# Patient Record
Sex: FEMALE | Race: WHITE
[De-identification: names, ages, dates, MRNs, and addresses within clinical notes are randomized per-mention and may not be internally consistent; named-entity substitution may affect disease eponyms.]

---

## 2018-09-26 ENCOUNTER — HOSPITAL ENCOUNTER (EMERGENCY)
Dept: HOSPITAL 93 - ER | Age: 78
Discharge: HOME | End: 2018-09-26
Payer: COMMERCIAL

## 2018-09-26 VITALS — WEIGHT: 130 LBS | HEIGHT: 59 IN | BODY MASS INDEX: 26.21 KG/M2

## 2018-09-26 DIAGNOSIS — I25.10: Primary | ICD-10-CM

## 2019-04-06 ENCOUNTER — HOSPITAL ENCOUNTER (INPATIENT)
Dept: HOSPITAL 93 - ER | Age: 79
LOS: 12 days | Discharge: HOME | DRG: 280 | End: 2019-04-18
Attending: INTERNAL MEDICINE | Admitting: INTERNAL MEDICINE
Payer: COMMERCIAL

## 2019-04-06 VITALS — HEIGHT: 63 IN | BODY MASS INDEX: 30.12 KG/M2 | WEIGHT: 170 LBS

## 2019-04-06 DIAGNOSIS — F43.22: ICD-10-CM

## 2019-04-06 DIAGNOSIS — I11.0: ICD-10-CM

## 2019-04-06 DIAGNOSIS — B95.2: ICD-10-CM

## 2019-04-06 DIAGNOSIS — C91.10: ICD-10-CM

## 2019-04-06 DIAGNOSIS — B96.29: ICD-10-CM

## 2019-04-06 DIAGNOSIS — I63.89: ICD-10-CM

## 2019-04-06 DIAGNOSIS — C94.80: ICD-10-CM

## 2019-04-06 DIAGNOSIS — N39.0: ICD-10-CM

## 2019-04-06 DIAGNOSIS — Z16.12: ICD-10-CM

## 2019-04-06 DIAGNOSIS — I21.09: Primary | ICD-10-CM

## 2019-04-06 DIAGNOSIS — I26.99: ICD-10-CM

## 2019-04-06 DIAGNOSIS — H26.8: ICD-10-CM

## 2019-04-06 DIAGNOSIS — I25.10: ICD-10-CM

## 2019-04-06 DIAGNOSIS — M81.0: ICD-10-CM

## 2019-04-06 DIAGNOSIS — J96.02: ICD-10-CM

## 2019-04-06 DIAGNOSIS — I48.0: ICD-10-CM

## 2019-04-06 DIAGNOSIS — I50.20: ICD-10-CM

## 2019-04-06 PROCEDURE — 4A033R1 MEASUREMENT OF ARTERIAL SATURATION, PERIPHERAL, PERCUTANEOUS APPROACH: ICD-10-PCS | Performed by: INTERNAL MEDICINE

## 2019-04-06 PROCEDURE — B226Y0Z COMPUTERIZED TOMOGRAPHY (CT SCAN) OF RIGHT AND LEFT HEART USING OTHER CONTRAST, UNENHANCED AND ENHANCED: ICD-10-PCS | Performed by: INTERNAL MEDICINE

## 2019-04-06 PROCEDURE — BB24ZZZ COMPUTERIZED TOMOGRAPHY (CT SCAN) OF BILATERAL LUNGS: ICD-10-PCS | Performed by: INTERNAL MEDICINE

## 2019-04-06 PROCEDURE — BW28ZZZ COMPUTERIZED TOMOGRAPHY (CT SCAN) OF HEAD: ICD-10-PCS | Performed by: INTERNAL MEDICINE

## 2019-04-06 PROCEDURE — B246ZZZ ULTRASONOGRAPHY OF RIGHT AND LEFT HEART: ICD-10-PCS | Performed by: INTERNAL MEDICINE

## 2019-04-06 PROCEDURE — 3E0F7GC INTRODUCTION OF OTHER THERAPEUTIC SUBSTANCE INTO RESPIRATORY TRACT, VIA NATURAL OR ARTIFICIAL OPENING: ICD-10-PCS | Performed by: INTERNAL MEDICINE

## 2019-04-06 NOTE — NUR
SE RECIBE PTE. FREMENINA ALERTA CONCIENTE Y ORIENTADA EN AMBULANCIA PTE.
REFIERE HOY AL MEDIO RADHA LE COMENZO DIFICULTAD RESPIRATORIA Y EN EL HOGAR
NUESTRA SENORA DE LA PROVIDENCIA PTE. ESTUVO CON PRESIONES ARTERIALES ELEVADAS
AL LLEGAR A AQUI PRESENTO PRESIONES /56. SUBICA EN CHEST PAIN UNIT SE
CONECTA A MONITOR CARDIACO Y SE LE REALIZA EKG. PTE. LLEGA CANALIZADA EN BRAZO
RT. CON ANGIO #22 DE AMBULANCIA. BAJANDOLE .9NS DE 500ML. PTE. CON VARIAS
FRACTURAS DEBIDO A CAIDA.

## 2019-04-06 NOTE — NUR
LUEGO DE FLUID CHALLENGE DE 500ML PTE CON PRESION DE 62/47(52) EN MONITOR Y NO
AUDIBLE MANUAL,SE OBSERVA PTE SUDOROSA Y SE NOTIFICA A DR SIMENTAL QUIEN INDICA
ADMINISTRAR FLUID CHALLENGE DE 500ML Y REALIZAR CT DE PECHO.PTE SIN EGRESO AL
MOMENTO.SE CECILIA BAJO OBSERVACION POR CAMBIOS.

## 2019-04-07 PROCEDURE — 0BH17EZ INSERTION OF ENDOTRACHEAL AIRWAY INTO TRACHEA, VIA NATURAL OR ARTIFICIAL OPENING: ICD-10-PCS | Performed by: INTERNAL MEDICINE

## 2019-04-07 PROCEDURE — 4A12X4Z MONITORING OF CARDIAC ELECTRICAL ACTIVITY, EXTERNAL APPROACH: ICD-10-PCS | Performed by: INTERNAL MEDICINE

## 2019-04-07 PROCEDURE — 5A1945Z RESPIRATORY VENTILATION, 24-96 CONSECUTIVE HOURS: ICD-10-PCS | Performed by: INTERNAL MEDICINE

## 2019-04-10 PROCEDURE — 8E0ZXY6 ISOLATION: ICD-10-PCS | Performed by: INTERNAL MEDICINE

## 2020-09-25 ENCOUNTER — HOSPITAL ENCOUNTER (INPATIENT)
Dept: HOSPITAL 93 - ER | Age: 80
LOS: 33 days | Discharge: HOME | DRG: 981 | End: 2020-10-28
Attending: INTERNAL MEDICINE | Admitting: INTERNAL MEDICINE
Payer: COMMERCIAL

## 2020-09-25 VITALS — BODY MASS INDEX: 30.12 KG/M2 | HEIGHT: 63 IN | WEIGHT: 170 LBS

## 2020-09-25 DIAGNOSIS — K57.30: ICD-10-CM

## 2020-09-25 DIAGNOSIS — L89.312: ICD-10-CM

## 2020-09-25 DIAGNOSIS — D64.9: ICD-10-CM

## 2020-09-25 DIAGNOSIS — Z91.89: ICD-10-CM

## 2020-09-25 DIAGNOSIS — N39.0: ICD-10-CM

## 2020-09-25 DIAGNOSIS — I50.40: ICD-10-CM

## 2020-09-25 DIAGNOSIS — N32.1: Primary | ICD-10-CM

## 2020-09-25 DIAGNOSIS — I25.10: ICD-10-CM

## 2020-09-25 DIAGNOSIS — F43.22: ICD-10-CM

## 2020-09-25 DIAGNOSIS — M81.0: ICD-10-CM

## 2020-09-25 DIAGNOSIS — I48.0: ICD-10-CM

## 2020-09-25 DIAGNOSIS — Z93.3: ICD-10-CM

## 2020-09-25 DIAGNOSIS — Z74.1: ICD-10-CM

## 2020-09-25 DIAGNOSIS — Z99.89: ICD-10-CM

## 2020-09-25 DIAGNOSIS — L89.152: ICD-10-CM

## 2020-09-25 DIAGNOSIS — B96.89: ICD-10-CM

## 2020-09-25 DIAGNOSIS — J96.90: ICD-10-CM

## 2020-09-25 DIAGNOSIS — Z20.828: ICD-10-CM

## 2020-09-25 DIAGNOSIS — C91.10: ICD-10-CM

## 2020-09-25 DIAGNOSIS — J81.1: ICD-10-CM

## 2020-09-25 DIAGNOSIS — E11.8: ICD-10-CM

## 2020-09-25 DIAGNOSIS — E11.65: ICD-10-CM

## 2020-09-25 DIAGNOSIS — R47.9: ICD-10-CM

## 2020-09-25 PROCEDURE — BW21ZZZ COMPUTERIZED TOMOGRAPHY (CT SCAN) OF ABDOMEN AND PELVIS: ICD-10-PCS | Performed by: INTERNAL MEDICINE

## 2020-09-25 PROCEDURE — 4A033R1 MEASUREMENT OF ARTERIAL SATURATION, PERIPHERAL, PERCUTANEOUS APPROACH: ICD-10-PCS | Performed by: INTERNAL MEDICINE

## 2020-09-26 PROCEDURE — 4A12X4Z MONITORING OF CARDIAC ELECTRICAL ACTIVITY, EXTERNAL APPROACH: ICD-10-PCS | Performed by: INTERNAL MEDICINE

## 2020-10-05 PROCEDURE — B246ZZZ ULTRASONOGRAPHY OF RIGHT AND LEFT HEART: ICD-10-PCS | Performed by: INTERNAL MEDICINE

## 2020-10-06 PROCEDURE — 30233P1 TRANSFUSION OF NONAUTOLOGOUS FROZEN RED CELLS INTO PERIPHERAL VEIN, PERCUTANEOUS APPROACH: ICD-10-PCS | Performed by: INTERNAL MEDICINE

## 2020-10-08 PROCEDURE — 0DBN8ZX EXCISION OF SIGMOID COLON, VIA NATURAL OR ARTIFICIAL OPENING ENDOSCOPIC, DIAGNOSTIC: ICD-10-PCS | Performed by: COLON & RECTAL SURGERY

## 2020-10-09 PROCEDURE — 02HV33Z INSERTION OF INFUSION DEVICE INTO SUPERIOR VENA CAVA, PERCUTANEOUS APPROACH: ICD-10-PCS | Performed by: INTERNAL MEDICINE

## 2020-10-14 PROCEDURE — 0D1M4Z4 BYPASS DESCENDING COLON TO CUTANEOUS, PERCUTANEOUS ENDOSCOPIC APPROACH: ICD-10-PCS | Performed by: COLON & RECTAL SURGERY

## 2020-10-14 PROCEDURE — 8E0ZXY6 ISOLATION: ICD-10-PCS | Performed by: INTERNAL MEDICINE

## 2020-10-16 PROCEDURE — 3E0F7GC INTRODUCTION OF OTHER THERAPEUTIC SUBSTANCE INTO RESPIRATORY TRACT, VIA NATURAL OR ARTIFICIAL OPENING: ICD-10-PCS | Performed by: INTERNAL MEDICINE

## 2020-10-19 PROCEDURE — 5A09457 ASSISTANCE WITH RESPIRATORY VENTILATION, 24-96 CONSECUTIVE HOURS, CONTINUOUS POSITIVE AIRWAY PRESSURE: ICD-10-PCS | Performed by: INTERNAL MEDICINE

## 2021-01-29 ENCOUNTER — HOSPITAL ENCOUNTER (INPATIENT)
Dept: HOSPITAL 93 - ER | Age: 81
LOS: 5 days | Discharge: HOME | DRG: 690 | End: 2021-02-03
Attending: INTERNAL MEDICINE | Admitting: INTERNAL MEDICINE
Payer: COMMERCIAL

## 2021-01-29 VITALS — BODY MASS INDEX: 27.42 KG/M2 | WEIGHT: 149 LBS | HEIGHT: 62 IN

## 2021-01-29 DIAGNOSIS — I25.10: ICD-10-CM

## 2021-01-29 DIAGNOSIS — I11.0: ICD-10-CM

## 2021-01-29 DIAGNOSIS — F02.80: ICD-10-CM

## 2021-01-29 DIAGNOSIS — Z20.822: ICD-10-CM

## 2021-01-29 DIAGNOSIS — G30.9: ICD-10-CM

## 2021-01-29 DIAGNOSIS — I50.22: ICD-10-CM

## 2021-01-29 DIAGNOSIS — B96.89: ICD-10-CM

## 2021-01-29 DIAGNOSIS — Z93.3: ICD-10-CM

## 2021-01-29 DIAGNOSIS — C91.10: ICD-10-CM

## 2021-01-29 DIAGNOSIS — Z79.84: ICD-10-CM

## 2021-01-29 DIAGNOSIS — E11.65: ICD-10-CM

## 2021-01-29 DIAGNOSIS — M81.0: ICD-10-CM

## 2021-01-29 DIAGNOSIS — I48.0: ICD-10-CM

## 2021-01-29 DIAGNOSIS — F43.22: ICD-10-CM

## 2021-01-29 DIAGNOSIS — Z74.01: ICD-10-CM

## 2021-01-29 DIAGNOSIS — L89.152: ICD-10-CM

## 2021-01-29 DIAGNOSIS — B95.2: ICD-10-CM

## 2021-01-29 DIAGNOSIS — N39.0: Primary | ICD-10-CM

## 2021-01-29 DIAGNOSIS — B96.5: ICD-10-CM

## 2021-01-29 PROCEDURE — CB2YYZZ TOMOGRAPHIC (TOMO) NUCLEAR MEDICINE IMAGING OF RESPIRATORY SYSTEM USING OTHER RADIONUCLIDE: ICD-10-PCS | Performed by: RADIOLOGY

## 2021-01-29 NOTE — NUR
PACIENTE QUE LLEGA EN COMPANIA DE PERSONAL DE EMERGENCIAS MEDICAS Y FAMILIAR
DESDE UN HOGAR DE ENVEJECIENTES. REFIEREN TRAER LA PACIENTE POR INFECCION DE
ORINA, BLANCOS ELEVADOS ULCERA SACRAL Y DOLOR EN PIERNA IZQUIERDA

## 2021-01-30 PROCEDURE — 4A12X4Z MONITORING OF CARDIAC ELECTRICAL ACTIVITY, EXTERNAL APPROACH: ICD-10-PCS | Performed by: INTERNAL MEDICINE

## 2021-01-30 PROCEDURE — 3E0F7SF INTRODUCTION OF OTHER GAS INTO RESPIRATORY TRACT, VIA NATURAL OR ARTIFICIAL OPENING: ICD-10-PCS | Performed by: INTERNAL MEDICINE

## 2021-03-01 ENCOUNTER — HOSPITAL ENCOUNTER (EMERGENCY)
Dept: HOSPITAL 93 - ER | Age: 81
Discharge: HOME | End: 2021-03-01
Payer: COMMERCIAL

## 2021-03-01 VITALS — WEIGHT: 150 LBS | HEIGHT: 64 IN | BODY MASS INDEX: 25.61 KG/M2

## 2021-03-01 DIAGNOSIS — E86.0: ICD-10-CM

## 2021-03-01 DIAGNOSIS — E11.649: Primary | ICD-10-CM

## 2021-03-01 DIAGNOSIS — E87.8: ICD-10-CM

## 2021-03-01 DIAGNOSIS — R53.1: ICD-10-CM

## 2021-12-06 ENCOUNTER — HOSPITAL ENCOUNTER (EMERGENCY)
Dept: HOSPITAL 93 - ER | Age: 81
LOS: 1 days | Discharge: HOME | End: 2021-12-07
Payer: COMMERCIAL

## 2021-12-06 VITALS — WEIGHT: 170 LBS | HEIGHT: 62 IN | BODY MASS INDEX: 31.28 KG/M2

## 2021-12-06 DIAGNOSIS — Z20.822: ICD-10-CM

## 2021-12-06 DIAGNOSIS — J22: Primary | ICD-10-CM

## 2022-03-01 ENCOUNTER — HOSPITAL ENCOUNTER (INPATIENT)
Dept: HOSPITAL 93 - ER | Age: 82
LOS: 9 days | DRG: 871 | End: 2022-03-10
Attending: INTERNAL MEDICINE | Admitting: INTERNAL MEDICINE
Payer: COMMERCIAL

## 2022-03-01 VITALS — HEIGHT: 62 IN | BODY MASS INDEX: 31.28 KG/M2 | WEIGHT: 170 LBS

## 2022-03-01 DIAGNOSIS — F43.22: ICD-10-CM

## 2022-03-01 DIAGNOSIS — L89.159: ICD-10-CM

## 2022-03-01 DIAGNOSIS — I25.10: ICD-10-CM

## 2022-03-01 DIAGNOSIS — E87.8: ICD-10-CM

## 2022-03-01 DIAGNOSIS — N39.0: ICD-10-CM

## 2022-03-01 DIAGNOSIS — Z93.3: ICD-10-CM

## 2022-03-01 DIAGNOSIS — E78.49: ICD-10-CM

## 2022-03-01 DIAGNOSIS — J44.9: ICD-10-CM

## 2022-03-01 DIAGNOSIS — Z20.822: ICD-10-CM

## 2022-03-01 DIAGNOSIS — R09.02: ICD-10-CM

## 2022-03-01 DIAGNOSIS — D72.828: ICD-10-CM

## 2022-03-01 DIAGNOSIS — R65.21: ICD-10-CM

## 2022-03-01 DIAGNOSIS — N17.8: ICD-10-CM

## 2022-03-01 DIAGNOSIS — A41.51: Primary | ICD-10-CM

## 2022-03-01 DIAGNOSIS — I48.0: ICD-10-CM

## 2022-03-01 DIAGNOSIS — D64.9: ICD-10-CM

## 2022-03-01 DIAGNOSIS — Z74.01: ICD-10-CM

## 2022-03-01 DIAGNOSIS — N32.1: ICD-10-CM

## 2022-03-01 DIAGNOSIS — E86.0: ICD-10-CM

## 2022-03-01 DIAGNOSIS — I21.A1: ICD-10-CM

## 2022-03-01 DIAGNOSIS — Z79.4: ICD-10-CM

## 2022-03-01 DIAGNOSIS — E11.65: ICD-10-CM

## 2022-03-01 DIAGNOSIS — B37.89: ICD-10-CM

## 2022-03-01 DIAGNOSIS — I10: ICD-10-CM

## 2022-03-02 PROCEDURE — B24BZZZ ULTRASONOGRAPHY OF HEART WITH AORTA: ICD-10-PCS | Performed by: INTERNAL MEDICINE

## 2022-03-03 PROCEDURE — 02HV33Z INSERTION OF INFUSION DEVICE INTO SUPERIOR VENA CAVA, PERCUTANEOUS APPROACH: ICD-10-PCS | Performed by: INTERNAL MEDICINE

## 2022-03-04 PROCEDURE — 30243N1 TRANSFUSION OF NONAUTOLOGOUS RED BLOOD CELLS INTO CENTRAL VEIN, PERCUTANEOUS APPROACH: ICD-10-PCS | Performed by: INTERNAL MEDICINE

## 2022-03-05 PROCEDURE — 4A12X4Z MONITORING OF CARDIAC ELECTRICAL ACTIVITY, EXTERNAL APPROACH: ICD-10-PCS | Performed by: INTERNAL MEDICINE

## 2023-07-01 ENCOUNTER — HOSPITAL ENCOUNTER (INPATIENT)
Dept: HOSPITAL 93 - ER | Age: 83
LOS: 11 days | Discharge: NURSING, REHAB OR LONG-TERM CARE | DRG: 690 | End: 2023-07-12
Attending: INTERNAL MEDICINE | Admitting: INTERNAL MEDICINE
Payer: COMMERCIAL

## 2023-07-01 VITALS — WEIGHT: 160 LBS | BODY MASS INDEX: 30.21 KG/M2 | HEIGHT: 61 IN

## 2023-07-01 DIAGNOSIS — I50.20: ICD-10-CM

## 2023-07-01 DIAGNOSIS — E86.0: ICD-10-CM

## 2023-07-01 DIAGNOSIS — G30.9: ICD-10-CM

## 2023-07-01 DIAGNOSIS — E11.9: ICD-10-CM

## 2023-07-01 DIAGNOSIS — N32.1: ICD-10-CM

## 2023-07-01 DIAGNOSIS — Z79.4: ICD-10-CM

## 2023-07-01 DIAGNOSIS — I25.10: ICD-10-CM

## 2023-07-01 DIAGNOSIS — I11.9: ICD-10-CM

## 2023-07-01 DIAGNOSIS — I11.0: ICD-10-CM

## 2023-07-01 DIAGNOSIS — Z16.12: ICD-10-CM

## 2023-07-01 DIAGNOSIS — I48.0: ICD-10-CM

## 2023-07-01 DIAGNOSIS — Z74.01: ICD-10-CM

## 2023-07-01 DIAGNOSIS — C95.10: ICD-10-CM

## 2023-07-01 DIAGNOSIS — B95.61: ICD-10-CM

## 2023-07-01 DIAGNOSIS — B95.2: ICD-10-CM

## 2023-07-01 DIAGNOSIS — L89.159: ICD-10-CM

## 2023-07-01 DIAGNOSIS — N39.0: Primary | ICD-10-CM

## 2023-07-01 DIAGNOSIS — D63.0: ICD-10-CM

## 2023-07-01 DIAGNOSIS — B96.4: ICD-10-CM

## 2023-07-01 DIAGNOSIS — F02.80: ICD-10-CM

## 2023-07-01 DIAGNOSIS — L08.9: ICD-10-CM

## 2023-07-01 PROCEDURE — BW21ZZZ COMPUTERIZED TOMOGRAPHY (CT SCAN) OF ABDOMEN AND PELVIS: ICD-10-PCS

## 2023-07-01 NOTE — NUR
SE RECIBE PTE FEMENINA ALERTA Y ORIENTADA EN PERSONA EN COMPANIA DE FAMILIAR
DEL TURNO ANTERIOR. SE OBSERVA CON BUEN PATRON RESPIRATORIO Y SIN QUEJA DE
DOLOR. VENOPUNCION PATENTE, VICKI DE EDEMA Y ERITEMA RECIBIENDO TERAPIA DE IVFS
0.9NSS BAJANDO A 150ML/HR. PTE CON SONDA URINARIA A GRAVEDAD DRENANDO EGRESO DE
ORINA COLOR GIA CON APROXIMADAMENTE 100ML. PTE EN SCOTTY NIVEL MAS BAJO CON
BARANDAS ELEVADAS Y FRENOS COLOCADOS POR SEGURIDAD. PENDIENTE CONSULTA CON
 YA NOTIFICADA.

## 2023-07-01 NOTE — NUR
SE RECIBE PTE ALERTA Y ORIENTADA EN PERSONA. PARAMEDICO REFIERE QUE LA ENVIARON
DE POR UTI Y ULCERAS SACRALES. SE YUNIER S/V Y SE UBICA.

## 2023-07-01 NOTE — NUR
SE RECIBE PTE FEMENIANA DE 82 YRS ALERTA CONCIENTE Y TRANQUILA , ES EVALUADA
POR EL ANKUR VACA QUIEN SE OBSERVA CON FOLIE CON ORINA SEDIMENTADA DE COLOR
VERDOSA Y DE MAL OLOR. PTE LLEGA EN AMBULANCIA , SE LE YADIRA S/V LA CUAL SE
DOCUEMTA. SE EJECUTA ORDEN POR MS.AYALA LOPES.

## 2023-07-03 PROCEDURE — 30233N1 TRANSFUSION OF NONAUTOLOGOUS RED BLOOD CELLS INTO PERIPHERAL VEIN, PERCUTANEOUS APPROACH: ICD-10-PCS | Performed by: INTERNAL MEDICINE

## 2023-07-06 PROCEDURE — 02HV33Z INSERTION OF INFUSION DEVICE INTO SUPERIOR VENA CAVA, PERCUTANEOUS APPROACH: ICD-10-PCS | Performed by: INTERNAL MEDICINE
